# Patient Record
Sex: MALE | Race: WHITE | NOT HISPANIC OR LATINO | Employment: FULL TIME | URBAN - METROPOLITAN AREA
[De-identification: names, ages, dates, MRNs, and addresses within clinical notes are randomized per-mention and may not be internally consistent; named-entity substitution may affect disease eponyms.]

---

## 2018-05-24 ENCOUNTER — APPOINTMENT (EMERGENCY)
Dept: RADIOLOGY | Facility: HOSPITAL | Age: 48
End: 2018-05-24
Payer: COMMERCIAL

## 2018-05-24 ENCOUNTER — HOSPITAL ENCOUNTER (EMERGENCY)
Facility: HOSPITAL | Age: 48
Discharge: HOME | End: 2018-05-24
Attending: EMERGENCY MEDICINE | Admitting: EMERGENCY MEDICINE
Payer: COMMERCIAL

## 2018-05-24 VITALS
BODY MASS INDEX: 29.82 KG/M2 | RESPIRATION RATE: 16 BRPM | SYSTOLIC BLOOD PRESSURE: 123 MMHG | HEART RATE: 82 BPM | HEIGHT: 67 IN | OXYGEN SATURATION: 99 % | WEIGHT: 190 LBS | DIASTOLIC BLOOD PRESSURE: 71 MMHG | TEMPERATURE: 97.9 F

## 2018-05-24 DIAGNOSIS — S91.332A PUNCTURE WOUND OF LEFT FOOT, INITIAL ENCOUNTER: Primary | ICD-10-CM

## 2018-05-24 PROCEDURE — 90715 TDAP VACCINE 7 YRS/> IM: CPT | Performed by: EMERGENCY MEDICINE

## 2018-05-24 PROCEDURE — 73630 X-RAY EXAM OF FOOT: CPT

## 2018-05-24 PROCEDURE — 90471 IMMUNIZATION ADMIN: CPT

## 2018-05-24 PROCEDURE — 99283 EMERGENCY DEPT VISIT LOW MDM: CPT

## 2018-05-24 RX ORDER — BACITRACIN, NEOMYCIN, POLYMYXIN B 400; 3.5; 5 [USP'U]/G; MG/G; [USP'U]/G
1 OINTMENT TOPICAL ONCE
Status: COMPLETED | OUTPATIENT
Start: 2018-05-24 | End: 2018-05-24

## 2018-05-24 RX ORDER — CEPHALEXIN 250 MG/1
500 CAPSULE ORAL ONCE
Status: COMPLETED | OUTPATIENT
Start: 2018-05-24 | End: 2018-05-24

## 2018-05-24 RX ORDER — CEPHALEXIN 500 MG/1
500 CAPSULE ORAL EVERY 6 HOURS SCHEDULED
Qty: 28 CAPSULE | Refills: 0 | Status: SHIPPED | OUTPATIENT
Start: 2018-05-24 | End: 2018-05-31

## 2018-05-24 RX ORDER — NAPROXEN 500 MG/1
500 TABLET ORAL 2 TIMES DAILY WITH MEALS
Qty: 20 TABLET | Refills: 0 | Status: SHIPPED | OUTPATIENT
Start: 2018-05-24 | End: 2018-06-03

## 2018-05-24 RX ORDER — NAPROXEN 250 MG/1
500 TABLET ORAL ONCE
Status: COMPLETED | OUTPATIENT
Start: 2018-05-24 | End: 2018-05-24

## 2018-05-24 RX ADMIN — TETANUS TOXOID, REDUCED DIPHTHERIA TOXOID AND ACELLULAR PERTUSSIS VACCINE, ADSORBED 0.5 ML: 5; 2.5; 8; 8; 2.5 SUSPENSION INTRAMUSCULAR at 18:27

## 2018-05-24 RX ADMIN — CEPHALEXIN 500 MG: 250 CAPSULE ORAL at 18:27

## 2018-05-24 RX ADMIN — BACITRACIN ZINC NEOMYCIN SULFATE POLYMYXIN B SULFATE 1 LARGE APPLICATION: 400; 3.5; 5 OINTMENT TOPICAL at 18:28

## 2018-05-24 RX ADMIN — NAPROXEN 500 MG: 250 TABLET ORAL at 18:27

## 2018-05-24 NOTE — ED PROVIDER NOTES
History  Chief Complaint   Patient presents with    Puncture Wound     pt stepped on josephine nail, needs a tetanus shot      42-year-old male without past medical history presenting chief complaint puncture wound to plantar surface of his left foot  He was working his house and stepped on a josephine nail, it went through his shoe and sock and into the distal plantar surface of his left foot  He removed the nail and came to emergency department questioning tetanus shot he has a small puncture wound to the plantar surface of his left foot only he has no weakness paresthesias or anesthesia he has no history of diabetes or poor wound healing easy bleeding or bruising is no injuries complaints or concerns otherwise denies a complete review systems as noted            None       Past Medical History:   Diagnosis Date    Cardiac disease        History reviewed  No pertinent surgical history  History reviewed  No pertinent family history  I have reviewed and agree with the history as documented  Social History   Substance Use Topics    Smoking status: Never Smoker    Smokeless tobacco: Never Used    Alcohol use Yes        Review of Systems   Constitutional: Negative for chills and fever  Gastrointestinal: Negative for nausea and vomiting  Musculoskeletal: Negative for gait problem and joint swelling  Skin: Positive for wound  Negative for color change  Allergic/Immunologic: Negative for immunocompromised state  Neurological: Negative for weakness and numbness  Hematological: Negative for adenopathy  Does not bruise/bleed easily  Psychiatric/Behavioral: Negative for agitation and behavioral problems  All other systems reviewed and are negative  Physical Exam  Physical Exam   Constitutional: He is oriented to person, place, and time  He appears well-developed and well-nourished  No distress  HENT:   Head: Normocephalic and atraumatic     Eyes: EOM are normal  Pupils are equal, round, and reactive to light  Neck: Normal range of motion  Neck supple  No tracheal deviation present  Musculoskeletal:   Patient has a single puncture wound on the plantar aspect of his left distal foot minimal tenderness no apparent foreign body no other acute finding   Neurological: He is alert and oriented to person, place, and time  No cranial nerve deficit  He exhibits normal muscle tone  Coordination normal    Skin: Skin is warm and dry  No rash noted  Psychiatric: He has a normal mood and affect  His behavior is normal    Nursing note and vitals reviewed  Vital Signs  ED Triage Vitals [05/24/18 1807]   Temperature Pulse Respirations Blood Pressure SpO2   97 9 °F (36 6 °C) 82 16 123/71 99 %      Temp Source Heart Rate Source Patient Position - Orthostatic VS BP Location FiO2 (%)   Oral Monitor Sitting Right arm --      Pain Score       1           Vitals:    05/24/18 1807   BP: 123/71   Pulse: 82   Patient Position - Orthostatic VS: Sitting       Visual Acuity      ED Medications  Medications   cephalexin (KEFLEX) capsule 500 mg (500 mg Oral Given 5/24/18 1827)   tetanus-diphtheria-acellular pertussis (BOOSTRIX) IM injection 0 5 mL (0 5 mL Intramuscular Given 5/24/18 1827)   naproxen (NAPROSYN) tablet 500 mg (500 mg Oral Given 5/24/18 1827)   neomycin-bacitracin-polymyxin b (NEOSPORIN) ointment 1 large application (1 large application Topical Given 5/24/18 1828)       Diagnostic Studies  Results Reviewed     None                 XR foot 3+ views LEFT   ED Interpretation by Jennyfer Brooke DO (05/24 1914)   No foreign body or osseous abnormality      Final Result by Addie Christopher MD (05/25 1450)      No acute osseous abnormality              Workstation performed: [id number]                    Procedures  Procedures       Phone Contacts  ED Phone Contact    ED Course  ED Course as of May 26 0017   u May 24, 2018   1916 Patient seen evaluatedDischarge understands agrees to discharge return instructions MDM  Number of Diagnoses or Management Options  Puncture wound of left foot, initial encounter:   Diagnosis management comments: 61-year-old male denies past medical history accidental puncture wound to the plantar surface of his left foot josephine nail, notes very small puncture wound to the plantar surface foot no other injuries complaints or concerns on exam he is afebrile normal vital signs clinically well-appearing, puncture wound noted without other acute findings x-ray reviewed no but osseous injury or apparent foreign body, will encourage hot soaks, Neosporin, prophylactic antibiotics, will update tetanus symptom control, cast shoe for comfort close return follow-up instructions    CritCare Time    Disposition  Final diagnoses:   Puncture wound of left foot, initial encounter     Time reflects when diagnosis was documented in both MDM as applicable and the Disposition within this note     Time User Action Codes Description Comment    5/24/2018  7:19 PM Ines, [address] Puncture wound of left foot, initial encounter       ED Disposition     ED Disposition Condition Comment    Discharge  Reise[facility] 3 discharge to home/self care      Condition at discharge: Good        Follow-up Information     Follow up With Specialties Details Why Contact Info Additional Information    [address] [facility] Emergency Department Emergency Medicine  If symptoms worsen [address] [place] [id number]  [phone] [place] [facility], [address], [place], [facility]          Discharge Medication List as of 5/24/2018  7:20 PM      START taking these medications    Details   cephalexin (KEFLEX) 500 mg capsule Take 1 capsule (500 mg total) by mouth every 6 (six) hours for 7 days, Starting Thu 5/24/2018, Until Thu 5/31/2018, Print      naproxen (NAPROSYN) 500 mg tablet Take 1 tablet (500 mg total) by mouth 2 (two) times a day with meals for 10 days, Starting Thu 5/24/2018, Until Sun 6/3/2018, Print           No discharge procedures on file      ED Provider  Electronically Signed by           Brian Mtz DO  05/26/18 1092

## 2018-05-24 NOTE — DISCHARGE INSTRUCTIONS
Please take the antibiotics, continue warm soaks, return if you worsening or other concerning symptoms otherwise follow up as instructed     Puncture Wound   AMBULATORY CARE:   A puncture wound is a hole in the skin made by a sharp, pointed object  Signs and symptoms of puncture wounds may include  a bruised or swollen area  You may have bleeding, pain, or trouble moving the affected area  Seek care immediately if:   · You have severe pain  · You have numbness or tingling in the area of your wound  · Your wound starts bleeding and does not stop, even after you apply pressure  Contact your healthcare provider if:   · You have new drainage or a bad odor coming from the wound  · You have a fever  · You have increased swelling, redness, or pain  · You have red streaks on your skin coming from your wound  · You have questions or concerns about your condition or care  Treatment  depends on how severe the wound is, its location, and whether other areas are affected  It may also depend on your health and the length of time you have had the wound  You may need any of the following:  · Wound cleaning  may be needed to remove dirt or debris  This will decrease the chance of infection  Before the wound is cleaned, your healthcare provider may give you medicine to numb the area and help you relax  · Medicine  to treat pain or a bacterial infection may be given  Tell your healthcare provider if you have had the tetanus vaccine or a booster within the last 5 years  You may be given a tetanus shot, if needed  · Surgery  may be needed if your wound needs a lot of cleaning or removal of foreign objects  Your wound may be left open until it heals, or it may be closed with stitches  Wound care:  Keep your wound clean and dry  When you are allowed to bathe, carefully wash the wound with soap and water  Dry the area and put on new, clean bandages as directed   Change your bandages when they get wet or dirty   Manage your symptoms:   · Rest  your injured area as much as possible  If the puncture wound is in your leg or foot, use crutches as directed  This will help keep the weight off your injured leg or foot as it heals  · Elevate  your injured area above the level of your heart as often as you can  This will help decrease swelling and pain  Prop your injured area on pillows or blankets to keep it elevated comfortably  Follow up with your healthcare provider in 2 to 3 days:  Write down your questions so you remember to ask them during your visits  © 2017 [facility] Information is for End User's use only and may not be sold, redistributed or otherwise used for commercial purposes  All illustrations and images included in CareNotes® are the copyrighted property of [employer]  or [address]  The above information is an  only  It is not intended as medical advice for individual conditions or treatments  Talk to your doctor, nurse or pharmacist before following any medical regimen to see if it is safe and effective for you